# Patient Record
Sex: MALE | Race: WHITE | Employment: OTHER | URBAN - METROPOLITAN AREA
[De-identification: names, ages, dates, MRNs, and addresses within clinical notes are randomized per-mention and may not be internally consistent; named-entity substitution may affect disease eponyms.]

---

## 2019-01-29 ENCOUNTER — HOSPITAL ENCOUNTER (OUTPATIENT)
Dept: PHYSICAL THERAPY | Age: 74
Discharge: HOME | End: 2019-01-29
Payer: MEDICARE

## 2019-01-29 DIAGNOSIS — M54.5 LOW BACK PAIN, UNSPECIFIED BACK PAIN LATERALITY, UNSPECIFIED CHRONICITY, WITH SCIATICA PRESENCE UNSPECIFIED: ICD-10-CM

## 2019-01-29 DIAGNOSIS — M62.838 MUSCLE SPASM: ICD-10-CM

## 2019-01-29 PROCEDURE — 97161 PT EVAL LOW COMPLEX 20 MIN: CPT

## 2019-01-29 PROCEDURE — 97110 THERAPEUTIC EXERCISES: CPT

## 2019-01-29 NOTE — THERAPY EVALUATION
Charlie Mustafa  : 1945  Primary: [place] [employer] *  Secondary:  [facility] at [address]  [phone]   [phone]         OUTPATIENT PHYSICAL THERAPY:Initial Assessment 2019    ICD-10: Treatment Diagnosis: Low back pain (M54.5)  Difficulty in walking, not elsewhere classified (R26.2)  Precautions/Allergies:   Patient has no known allergies. MD Orders: evaluate and treat MEDICAL/REFERRING DIAGNOSIS:  Muscle spasm [M62.838]  Low back pain, unspecified back pain laterality, unspecified chronicity, with sciatica presence unspecified [M54.5]   DATE OF ONSET: 1 year ago  REFERRING PHYSICIAN: Patric Reveles MD  RETURN PHYSICIAN APPOINTMENT:      ASSESSMENT (Date: 19):  Mr. Lizeth Cantu presents to physical therapy with L sided lumbar pain occasionally during functional activities. He demonstrates decrease L hip ROM, L sided core and hip weakness, decreased functional mobility, and increased pain with functional activities. He would benefit from skilled PT to address the problem list and goals below. PROBLEM LIST (Impacting functional limitations):  1. Decreased Strength  2. Decreased ADL/Functional Activities  3. Decreased Ambulation Ability/Technique  4. Decreased Balance  5. Increased Pain  6. Decreased Activity Tolerance  7. Decreased Flexibility/Joint Mobility  8. Decreased [place] with Home Exercise Program INTERVENTIONS PLANNED:  1. Balance Exercise  2. Bed Mobility  3. Cold  4. Family Education  5. Heat  6. Home Exercise Program (HEP)  7. Manual Therapy  8. Therapeutic Activites  9. Therapeutic Exercise/Strengthening   TREATMENT PLAN:  Effective Dates: 2019 TO 3/30/2019 (60 days). Frequency/Duration: 2 times a week for 60 Days  GOALS: (Goals have been discussed and agreed upon with patient.)  Discharge Goals: Time Frame: 60 days  1. Patient will be independent with HEP.    2. Patient will have an decrease on the Oswestry to 0/50 in order to return to his PLOF. 3. Patient will demonstrate good stability during single leg bridging indicating improved core muscle activation. 4. Patient will verbalize being able to complete ADLs and IADLs with pain no greater than 1/10 in order to return to activities without compensations. Rehabilitation Potential For Stated Goals: Good  Regarding Nupur Olivares's therapy, I certify that the treatment plan above will be carried out by a therapist or under their direction. Thank you for this referral,  MELLO RoseT, NCS     Referring Physician Signature: Evelyn Edmonds MD              Date                    HISTORY:   Patient Stated Goal:        Pain relief  History of Present Injury/Illness (Reason for Referral):  Patient reports pain in his low back for about a year. On the left side and in the center. He noticed it first when he was trying to bowl. He will feel twinges every now and then but it goes away if he stops doing that activity. Current pain 1/10.  24 hour pain 2-3/10. He got a massage a few weeks ago that made his pain 5/10 for several days. He has had back pain previously that has gone away on its own. Has occasional difficulty with bowling, gardening, climbing cell towers  Past Medical History/Comorbidities:   Mr. Frida Fierro  has a past medical history of Arthritis and Elevated PSA. Mr. Frida Fierro  has a past surgical history that includes hx colonoscopy (2010); hx heent (Bilateral, 2009); hx heent (Bilateral, 2013); and hx tonsillectomy. Per medical history questionnaire/therapist interview: Arthritis, Dislocation/Fracture and Joint Pain  Social History/Living Environment:     lives with wife in a 2 story home.   Bedroom downstairs   Prior Level of Function/Work/Activity:  independent  Dominant Side:         RIGHT  Current Medications:    Current Outpatient Medications:     sildenafil, antihypertensive, (REVATIO) 20 mg tablet, Take 1 Tab by mouth three (3) times daily. , Disp: 30 Tab, Rfl: 1     Date Last Reviewed:  1/29/2019       Ambulatory/Rehab Services H2 Model Falls Risk Assessment    Risk Factors:       (1)  Gender [Male] Ability to Rise from Chair:       (0)  Ability to rise in a single movement    Falls Prevention Plan:       No modifications necessary   Total: (5 or greater = High Risk): 1    ©2010 [facility] of [facility]. All Rights Reserved. [facility] [employer] Patent #[id number]. Federal Law prohibits the replication, distribution or use without written permission from [facility] of [address] [place] [address]        Number of Personal Factors/Comorbidities that affect the Plan of Care: 0: LOW COMPLEXITY   EXAMINATION:   Observation/Orthostatic Postural Assessment:          genuvarus B, symmetrical leg length    Mental Status:          WFL  Palpation:          Pain with palpation to lumbar paraspinals on the L side  Sensation:         Light tough: WFL; Proprioception: WFL  Skin Integrity:          intact  Vision:          NT  Special Tests:          Fall to the L with bridge and L leg march, stable on the R side. Shooting pain with palpation of the piriformis muscle on the L, decrease 90-90 straight leg raise L more than R. Coordination:       NT  Balance:          Able to single leg balance x 10 seconds B with more hips balance reactions on the R    Lower Extremity:   Strength PROM   Action R L R  L    Hip Flexion       Hip Extension  4     Hip Abduction  4+     Hip Adduction       Knee Flexion       Knee Extension       Dorsi Flexion       Plantar Flexion       Inversion       Eversion             Decrease core strength during functional testing    Upper Extremity:         NT  Functional Mobility:         Gait/Ambulation:  Ambulates with good gait speed, no evidence of imbalance        Transfers:  independent        Bed Mobility:  Independent with minimal to no pain during rolling side to side        Stairs:   Mod I with 1/10 pain        Wheelchair: NT              Body Structures Involved:  1. Nerves  2. Joints  3. Muscles  4. Ligaments Body Functions Affected:  1. Sensory/Pain  2. Neuromusculoskeletal  3. Movement Related Activities and Participation Affected:  1. Domestic Life  2. Interpersonal Interactions and Relationships  3. Community, Social and [place]   Number of elements that affect the Plan of Care: 4+: HIGH COMPLEXITY   CLINICAL PRESENTATION:   Presentation: Stable and uncomplicated: LOW COMPLEXITY   CLINICAL DECISION MAKING:   Outcome Measure: Tool Used: Modified Oswestry Low Back Pain Questionnaire  Score:  Initial: 5/50  Most Recent: X/50 (Date: -- )   Interpretation of Score: Each section is scored on a 0-5 scale, 5 representing the greatest disability. The scores of each section are added together for a total score of 50. Medical Necessity:   · Patient is expected to demonstrate progress in strength, range of motion, balance and functional technique to decrease pain with functional mobility. · Patient demonstrates good rehab potential due to higher previous functional level. Reason for Services/Other Comments:  · Patient continues to require modification of therapeutic interventions to increase complexity of exercises. Use of outcome tool(s) and clinical judgement create a POC that gives a: Questionable prediction of patient's progress: MODERATE COMPLEXITY   TREATMENT:   (In addition to Assessment/Re-Assessment sessions the following treatments were rendered)  Pre Treatment Symptoms: see above    Evaluation: [id number]    Therapeutic Exercise: ( 4517-1650):  Exercises per grid below to improve mobility, strength and balance. Required moderate visual, verbal and tactile cues to promote proper body alignment and promote proper body mechanics. Progressed complexity of movement as indicated.    Date:  1/29/19 Date:   Date:     Activity/Exercise Parameters Parameters Parameters   TA activation in hooklying X 5 reps with 5 second hold     TA activation with march in hooklying X 5 reps each leg     HS stretch 2 x 30 sec L     Bridges with march X 5 reps- falling to the L with L leg march                             Treatment/Session Assessment:  See assessment above. · Pain/ Symptoms: Initial:   1/10 Post Session:  1/10 ·   Compliance with Program/Exercises: Will assess as treatment progresses. · Recommendations/Intent for next treatment session: \"Next visit will focus on advancements to more challenging activities and reduction in assistance provided\".   Total Treatment Duration:  PT Patient Time In/Time Out  Time In: 1300  Time Out: [place] 13, DPT

## 2019-01-31 ENCOUNTER — APPOINTMENT (OUTPATIENT)
Dept: PHYSICAL THERAPY | Age: 74
End: 2019-01-31
Payer: MEDICARE

## 2019-02-05 ENCOUNTER — HOSPITAL ENCOUNTER (OUTPATIENT)
Dept: PHYSICAL THERAPY | Age: 74
Discharge: HOME | End: 2019-02-05
Payer: MEDICARE

## 2019-02-05 PROCEDURE — 97110 THERAPEUTIC EXERCISES: CPT

## 2019-02-05 PROCEDURE — 97140 MANUAL THERAPY 1/> REGIONS: CPT

## 2019-02-05 NOTE — PROGRESS NOTES
Trung Ty  : 1945  Primary: [employer] *  Secondary:  [facility]rapy [facility] at [address]  [phone]   [phone]         OUTPATIENT PHYSICAL THERAPY:Daily Note 2019    ICD-10: Treatment Diagnosis: Low back pain (M54.5)  Difficulty in walking, not elsewhere classified (R26.2)  Precautions/Allergies:   Patient has no known allergies. MD Orders: evaluate and treat MEDICAL/REFERRING DIAGNOSIS:  Other muscle spasm [M62.838]  Low back pain [M54.5]   DATE OF ONSET: 1 year ago  REFERRING PHYSICIAN: Becky Bonds MD  RETURN PHYSICIAN APPOINTMENT:      ASSESSMENT (Date: 19):  Mr. Skylar Malone presents to physical therapy with L sided lumbar pain occasionally during functional activities. He demonstrates decrease L hip ROM, L sided core and hip weakness, decreased functional mobility, and increased pain with functional activities. He would benefit from skilled PT to address the problem list and goals below. PROBLEM LIST (Impacting functional limitations):  1. Decreased Strength  2. Decreased ADL/Functional Activities  3. Decreased Ambulation Ability/Technique  4. Decreased Balance  5. Increased Pain  6. Decreased Activity Tolerance  7. Decreased Flexibility/Joint Mobility  8. Decreased [place] with Home Exercise Program INTERVENTIONS PLANNED:  1. Balance Exercise  2. Bed Mobility  3. Cold  4. Family Education  5. Heat  6. Home Exercise Program (HEP)  7. Manual Therapy  8. Therapeutic Activites  9. Therapeutic Exercise/Strengthening   TREATMENT PLAN:  Effective Dates: 2019 TO 3/30/2019 (60 days). Frequency/Duration: 2 times a week for 60 Days  GOALS: (Goals have been discussed and agreed upon with patient.)  Discharge Goals: Time Frame: 60 days  1. Patient will be independent with HEP. 2. Patient will have an decrease on the Oswestry to 0/50 in order to return to his PLOF.     3. Patient will demonstrate good stability during single leg bridging indicating improved core muscle activation. 4. Patient will verbalize being able to complete ADLs and IADLs with pain no greater than 1/10 in order to return to activities without compensations. Rehabilitation Potential For Stated Goals: Good  Regarding Ankur George Olivares's therapy, I certify that the treatment plan above will be carried out by a therapist or under their direction. Thank you for this referral,  Sabina Garcia, DPT, NCS                 HISTORY:   Patient Stated Goal:        Pain relief  History of Present Injury/Illness (Reason for Referral):  Patient reports pain in his low back for about a year. On the left side and in the center. He noticed it first when he was trying to bowl. He will feel twinges every now and then but it goes away if he stops doing that activity. Current pain 1/10.  24 hour pain 2-3/10. He got a massage a few weeks ago that made his pain 5/10 for several days. He has had back pain previously that has gone away on its own. Has occasional difficulty with bowling, gardening, climbing cell towers  Past Medical History/Comorbidities:   Mr. Hollie White  has a past medical history of Arthritis and Elevated PSA. Mr. Hollie White  has a past surgical history that includes hx colonoscopy (2010); hx heent (Bilateral, 2009); hx heent (Bilateral, 2013); and hx tonsillectomy. Per medical history questionnaire/therapist interview: Arthritis, Dislocation/Fracture and Joint Pain  Social History/Living Environment:     lives with wife in a 2 story home. Bedroom downstairs   Prior Level of Function/Work/Activity:  independent  Dominant Side:         RIGHT  Current Medications:    Current Outpatient Medications:     sildenafil, antihypertensive, (REVATIO) 20 mg tablet, Take 1 Tab by mouth three (3) times daily. , Disp: 30 Tab, Rfl: 1     Date Last Reviewed:  2/5/2019       Ambulatory/Rehab Services H2 Model Falls Risk Assessment Risk Factors:       (1)  Gender [Male] Ability to Rise from Chair:       (0)  Ability to rise in a single movement    Falls Prevention Plan:       No modifications necessary   Total: (5 or greater = High Risk): 1    ©2010 [facility] of [employer] [place] Patent #[id number]. Federal Law prohibits the replication, distribution or use without written permission from [facility] of [employer]        Number of Personal Factors/Comorbidities that affect the Plan of Care: 0: LOW COMPLEXITY   EXAMINATION:   Observation/Orthostatic Postural Assessment:          genuvarus B, symmetrical leg length    Mental Status:          WFL  Palpation:          Pain with palpation to lumbar paraspinals on the L side  Sensation:         Light tough: WFL; Proprioception: WFL  Skin Integrity:          intact  Vision:          NT  Special Tests:          Fall to the L with bridge and L leg march, stable on the R side. Shooting pain with palpation of the piriformis muscle on the L, decrease 90-90 straight leg raise L more than R. Coordination:       NT  Balance:          Able to single leg balance x 10 seconds B with more hips balance reactions on the R    Lower Extremity:   Strength PROM   Action R L R  L    Hip Flexion       Hip Extension  4     Hip Abduction  4+     Hip Adduction       Knee Flexion       Knee Extension       Dorsi Flexion       Plantar Flexion       Inversion       Eversion             Decrease core strength during functional testing    Upper Extremity:         NT  Functional Mobility:         Gait/Ambulation:  Ambulates with good gait speed, no evidence of imbalance        Transfers:  independent        Bed Mobility:  Independent with minimal to no pain during rolling side to side        Stairs: Mod I with 1/10 pain        Wheelchair:  NT              Body Structures Involved:  1. Nerves  2. Joints  3. Muscles  4.  Ligaments Body Functions Affected:  1. Sensory/Pain  2. Neuromusculoskeletal  3. Movement Related Activities and Participation Affected:  1. Domestic Life  2. Interpersonal Interactions and Relationships  3. Community, Social and Independence [place]   Number of elements that affect the Plan of Care: 4+: HIGH COMPLEXITY   CLINICAL PRESENTATION:   Presentation: Stable and uncomplicated: LOW COMPLEXITY   CLINICAL DECISION MAKING:   Outcome Measure: Tool Used: Modified Oswestry Low Back Pain Questionnaire  Score:  Initial: 5/50  Most Recent: X/50 (Date: -- )   Interpretation of Score: Each section is scored on a 0-5 scale, 5 representing the greatest disability. The scores of each section are added together for a total score of 50. Medical Necessity:   · Patient is expected to demonstrate progress in strength, range of motion, balance and functional technique to decrease pain with functional mobility. · Patient demonstrates good rehab potential due to higher previous functional level. Reason for Services/Other Comments:  · Patient continues to require modification of therapeutic interventions to increase complexity of exercises. Use of outcome tool(s) and clinical judgement create a POC that gives a: Questionable prediction of patient's progress: MODERATE COMPLEXITY   TREATMENT:   (In addition to Assessment/Re-Assessment sessions the following treatments were rendered)  Pre Treatment Symptoms: patient reports no current pain. Says he had some pain with the bridging exercises. Therapeutic Exercise: (  40 minutes):  Exercises per grid below to improve mobility, strength and balance. Required moderate visual, verbal and tactile cues to promote proper body alignment and promote proper body mechanics. Progressed complexity of movement as indicated.    Date:  1/29/19 Date:   Date:      Activity/Exercise Parameters Parameters Parameters    TA activation in hooklying X 5 reps with 5 second hold X 10 reps with 5 sec hold     TA activation with march in hooklying X 5 reps each leg X 10 reps each     HS stretch 2 x 30 sec L 2 x 30 sec B      Bridges with march X 5 reps- falling to the L with L leg march 2 x 5 reps B      bike  X 5 minutes     Bird dogs  2 x 5 reps      Shotgun   3 x 5 sec hold      Prone hip extension   2 x 10 reps B     Dead bug   X 5 reps B     High knee marching  2 x 30' with 2 sec hold     Standing hip extension  X 10 reps B     SKTC  2 x 30 sec B                   MANUAL THERAPY: (13 minutes): Joint mobilization and Soft tissue mobilization was utilized and necessary because of the patient's restricted joint motion and restricted motion of soft tissue. (Used abbreviations: MET - muscle energy technique; PNF - proprioceptive neuromuscular facilitation; NMR - neuromuscular re-education; a/p - anterior to posterior; p/a - posterior to anterior, STM- soft tissue mobilization, MLD- modified lymphatic drainage)     In prone: STM to L glut region, piriformis  PA joint glides lumbar spine        Treatment/Session Assessment:  Patient reported no pain post session. Mild pain with manual therapy that eased with rest.  He was given an updated HEP. He continues to benefit from skilled PT to improve functional mobility and reduce pain during IADLs. · Pain/ Symptoms: Initial:   0/10 Post Session:  0/10 ·   Compliance with Program/Exercises: Will assess as treatment progresses. · Recommendations/Intent for next treatment session: \"Next visit will focus on advancements to more challenging activities and reduction in assistance provided\".   Total Treatment Duration:  PT Patient Time In/Time Out  Time In: 1592  Time Out: [address]7, DPT

## 2019-02-07 ENCOUNTER — HOSPITAL ENCOUNTER (OUTPATIENT)
Dept: PHYSICAL THERAPY | Age: 74
Discharge: HOME | End: 2019-02-07
Payer: MEDICARE

## 2019-02-07 PROCEDURE — 97140 MANUAL THERAPY 1/> REGIONS: CPT

## 2019-02-07 PROCEDURE — 97110 THERAPEUTIC EXERCISES: CPT

## 2019-02-07 NOTE — PROGRESS NOTES
Rachid Sage  : 1945  Primary: Iliana Louis Medicare Choice *  Secondary:  Therapy Center at [address]  [phone]   [phone]         OUTPATIENT PHYSICAL THERAPY:Daily Note 2019    ICD-10: Treatment Diagnosis: Low back pain (M54.5)  Difficulty in walking, not elsewhere classified (R26.2)  Precautions/Allergies:   Patient has no known allergies. MD Orders: evaluate and treat MEDICAL/REFERRING DIAGNOSIS:  Other muscle spasm [M62.838]  Low back pain [M54.5]   DATE OF ONSET: 1 year ago  REFERRING PHYSICIAN: Belkis Thomas MD  RETURN PHYSICIAN APPOINTMENT:      ASSESSMENT (Date: 19):  Mr. Caitlin Terrell presents to physical therapy with L sided lumbar pain occasionally during functional activities. He demonstrates decrease L hip ROM, L sided core and hip weakness, decreased functional mobility, and increased pain with functional activities. He would benefit from skilled PT to address the problem list and goals below. PROBLEM LIST (Impacting functional limitations):  1. Decreased Strength  2. Decreased ADL/Functional Activities  3. Decreased Ambulation Ability/Technique  4. Decreased Balance  5. Increased Pain  6. Decreased Activity Tolerance  7. Decreased Flexibility/Joint Mobility  8. Decreased [place] with Home Exercise Program INTERVENTIONS PLANNED:  1. Balance Exercise  2. Bed Mobility  3. Cold  4. Family Education  5. Heat  6. Home Exercise Program (HEP)  7. Manual Therapy  8. Therapeutic Activites  9. Therapeutic Exercise/Strengthening   TREATMENT PLAN:  Effective Dates: 2019 TO 3/30/2019 (60 days). Frequency/Duration: 2 times a week for 60 Days  GOALS: (Goals have been discussed and agreed upon with patient.)  Discharge Goals: Time Frame: 60 days  1. Patient will be independent with HEP. 2. Patient will have an decrease on the Oswestry to 0/50 in order to return to his PLOF.     3. Patient will demonstrate good stability during single leg bridging indicating improved core muscle activation. 4. Patient will verbalize being able to complete ADLs and IADLs with pain no greater than 1/10 in order to return to activities without compensations. Rehabilitation Potential For Stated Goals: Good  Regarding Lois Olivares's therapy, I certify that the treatment plan above will be carried out by a therapist or under their direction. Thank you for this referral,  Kimo Allen, DPT, NCS                 HISTORY:   Patient Stated Goal:        Pain relief  History of Present Injury/Illness (Reason for Referral):  Patient reports pain in his low back for about a year. On the left side and in the center. He noticed it first when he was trying to bowl. He will feel twinges every now and then but it goes away if he stops doing that activity. Current pain 1/10.  24 hour pain 2-3/10. He got a massage a few weeks ago that made his pain 5/10 for several days. He has had back pain previously that has gone away on its own. Has occasional difficulty with bowling, gardening, climbing cell towers  Past Medical History/Comorbidities:   Mr. Monica Martins  has a past medical history of Arthritis and Elevated PSA. Mr. Monica Martins  has a past surgical history that includes hx colonoscopy (2010); hx heent (Bilateral, 2009); hx heent (Bilateral, 2013); and hx tonsillectomy. Per medical history questionnaire/therapist interview: Arthritis, Dislocation/Fracture and Joint Pain  Social History/Living Environment:     lives with wife in a 2 story home. Bedroom downstairs   Prior Level of Function/Work/Activity:  independent  Dominant Side:         RIGHT  Current Medications:    Current Outpatient Medications:     sildenafil, antihypertensive, (REVATIO) 20 mg tablet, Take 1 Tab by mouth three (3) times daily. , Disp: 30 Tab, Rfl: 1     Date Last Reviewed:  2/7/2019       Ambulatory/Rehab Services H2 Model Falls Risk Assessment Risk Factors:       (1)  Gender [Male] Ability to Rise from Chair:       (0)  Ability to rise in a single movement    Falls Prevention Plan:       No modifications necessary   Total: (5 or greater = High Risk): 1    ©2010 [facility] Patent #[id number]. Federal Law prohibits the replication, distribution or use without written permission from [facility] of [employer]        Number of Personal Factors/Comorbidities that affect the Plan of Care: 0: LOW COMPLEXITY   EXAMINATION:   Observation/Orthostatic Postural Assessment:          genuvarus B, symmetrical leg length    Mental Status:          WFL  Palpation:          Pain with palpation to lumbar paraspinals on the L side  Sensation:         Light tough: WFL; Proprioception: WFL  Skin Integrity:          intact  Vision:          NT  Special Tests:          Fall to the L with bridge and L leg march, stable on the R side. Shooting pain with palpation of the piriformis muscle on the L, decrease 90-90 straight leg raise L more than R. Coordination:       NT  Balance:          Able to single leg balance x 10 seconds B with more hips balance reactions on the R    Lower Extremity:   Strength PROM   Action R L R  L    Hip Flexion       Hip Extension  4     Hip Abduction  4+     Hip Adduction       Knee Flexion       Knee Extension       Dorsi Flexion       Plantar Flexion       Inversion       Eversion             Decrease core strength during functional testing    Upper Extremity:         NT  Functional Mobility:         Gait/Ambulation:  Ambulates with good gait speed, no evidence of imbalance        Transfers:  independent        Bed Mobility:  Independent with minimal to no pain during rolling side to side        Stairs: Mod I with 1/10 pain        Wheelchair:  NT              Body Structures Involved:  1. Nerves  2. Joints  3. Muscles  4.  Ligaments Body Functions Affected:  1. Sensory/Pain  2. Neuromusculoskeletal  3. Movement Related Activities and Participation Affected:  1. Domestic Life  2. Interpersonal Interactions and Relationships  3. Community, Social and [place]   Number of elements that affect the Plan of Care: 4+: HIGH COMPLEXITY   CLINICAL PRESENTATION:   Presentation: Stable and uncomplicated: LOW COMPLEXITY   CLINICAL DECISION MAKING:   Outcome Measure: Tool Used: Modified Oswestry Low Back Pain Questionnaire  Score:  Initial: 5/50  Most Recent: X/50 (Date: -- )   Interpretation of Score: Each section is scored on a 0-5 scale, 5 representing the greatest disability. The scores of each section are added together for a total score of 50. Medical Necessity:   · Patient is expected to demonstrate progress in strength, range of motion, balance and functional technique to decrease pain with functional mobility. · Patient demonstrates good rehab potential due to higher previous functional level. Reason for Services/Other Comments:  · Patient continues to require modification of therapeutic interventions to increase complexity of exercises. Use of outcome tool(s) and clinical judgement create a POC that gives a: Questionable prediction of patient's progress: MODERATE COMPLEXITY   TREATMENT:   (In addition to Assessment/Re-Assessment sessions the following treatments were rendered)  Pre Treatment Symptoms: patient reports no pain with exercises. Has been doing his HEP. Therapeutic Exercise: (  40 minutes):  Exercises per grid below to improve mobility, strength and balance. Required moderate visual, verbal and tactile cues to promote proper body alignment and promote proper body mechanics. Progressed complexity of movement as indicated.    Date:  1/29/19 Date:  2/5/19 Date:  2/7/19    Activity/Exercise Parameters Parameters Parameters    TA activation in hooklying X 5 reps with 5 second hold X 10 reps with 5 sec hold X 10 reps with 5 sec hold TA activation with march in hooklying X 5 reps each leg X 10 reps each X 10 reps     HS stretch 2 x 30 sec L 2 x 30 sec B  2 x 30 sec B    Bridges with march X 5 reps- falling to the L with L leg march 2 x 5 reps B  X 10 reps B     bike  X 5 minutes X 5 minutes level 1    Bird dogs  2 x 5 reps  X 5 reps     Shotgun   3 x 5 sec hold      Prone hip extension   2 x 10 reps B 2 x 10 reps B    Dead bug   X 5 reps B 2 x 5 reps B    High knee marching  2 x 30' with 2 sec hold 2 x 30' with 2 sec hold     Standing hip extension  X 10 reps B 2 x 10 reps B     SKTC  2 x 30 sec B 2 x 30 sec B    LTR   X 5 reps B with 10 sec hold           MANUAL THERAPY: (13 minutes): Joint mobilization and Soft tissue mobilization was utilized and necessary because of the patient's restricted joint motion and restricted motion of soft tissue. (Used abbreviations: MET - muscle energy technique; PNF - proprioceptive neuromuscular facilitation; NMR - neuromuscular re-education; a/p - anterior to posterior; p/a - posterior to anterior, STM- soft tissue mobilization, MLD- modified lymphatic drainage)     In prone: STM to L glut region, piriformis  PA joint glides lumbar spine        Treatment/Session Assessment:  Patient had no pain with exercises today. Still demonstrating some core weakness and compensatory movements with exercises. He continues to benefit from skilled PT to decrease risk of reinjury. · Pain/ Symptoms: Initial:   0/10 Post Session:  0/10 ·   Compliance with Program/Exercises: Will assess as treatment progresses. · Recommendations/Intent for next treatment session: \"Next visit will focus on advancements to more challenging activities and reduction in assistance provided\".   Total Treatment Duration:  PT Patient Time In/Time Out  Time In: 8648  Time Out: [address], DPT

## 2019-02-12 ENCOUNTER — HOSPITAL ENCOUNTER (OUTPATIENT)
Dept: PHYSICAL THERAPY | Age: 74
Discharge: HOME | End: 2019-02-12
Payer: MEDICARE

## 2019-02-12 PROCEDURE — 97110 THERAPEUTIC EXERCISES: CPT

## 2019-02-12 PROCEDURE — 97140 MANUAL THERAPY 1/> REGIONS: CPT

## 2019-02-12 NOTE — PROGRESS NOTES
Oj Joyce  : 1945  Primary: Judi Louis Medicare Choice *  Secondary:  [facility]rapy Center at [address]  [phone]   [phone]         OUTPATIENT PHYSICAL THERAPY:Daily Note 2019    ICD-10: Treatment Diagnosis: Low back pain (M54.5)  Difficulty in walking, not elsewhere classified (R26.2)  Precautions/Allergies:   Patient has no known allergies. MD Orders: evaluate and treat MEDICAL/REFERRING DIAGNOSIS:  Other muscle spasm [M62.838]  Low back pain [M54.5]   DATE OF ONSET: 1 year ago  REFERRING PHYSICIAN: Luis Damon MD  RETURN PHYSICIAN APPOINTMENT:      ASSESSMENT (Date: 19):  Mr. Thomas Foley presents to physical therapy with L sided lumbar pain occasionally during functional activities. He demonstrates decrease L hip ROM, L sided core and hip weakness, decreased functional mobility, and increased pain with functional activities. He would benefit from skilled PT to address the problem list and goals below. PROBLEM LIST (Impacting functional limitations):  1. Decreased Strength  2. Decreased ADL/Functional Activities  3. Decreased Ambulation Ability/Technique  4. Decreased Balance  5. Increased Pain  6. Decreased Activity Tolerance  7. Decreased Flexibility/Joint Mobility  8. Decreased [place] with Home Exercise Program INTERVENTIONS PLANNED:  1. Balance Exercise  2. Bed Mobility  3. Cold  4. Family Education  5. Heat  6. Home Exercise Program (HEP)  7. Manual Therapy  8. Therapeutic Activites  9. Therapeutic Exercise/Strengthening   TREATMENT PLAN:  Effective Dates: 2019 TO 3/30/2019 (60 days). Frequency/Duration: 2 times a week for 60 Days  GOALS: (Goals have been discussed and agreed upon with patient.)  Discharge Goals: Time Frame: 60 days  1. Patient will be independent with HEP. 2. Patient will have an decrease on the Oswestry to 0/50 in order to return to his PLOF.     3. Patient will demonstrate good stability during single leg bridging indicating improved core muscle activation. 4. Patient will verbalize being able to complete ADLs and IADLs with pain no greater than 1/10 in order to return to activities without compensations. Rehabilitation Potential For Stated Goals: Good  Regarding Dexter Olivares's therapy, I certify that the treatment plan above will be carried out by a therapist or under their direction. Thank you for this referral,  Jacquelyn Byrne, DPT, NCS                 HISTORY:   Patient Stated Goal:        Pain relief  History of Present Injury/Illness (Reason for Referral):  Patient reports pain in his low back for about a year. On the left side and in the center. He noticed it first when he was trying to bowl. He will feel twinges every now and then but it goes away if he stops doing that activity. Current pain 1/10.  24 hour pain 2-3/10. He got a massage a few weeks ago that made his pain 5/10 for several days. He has had back pain previously that has gone away on its own. Has occasional difficulty with bowling, gardening, climbing cell towers  Past Medical History/Comorbidities:   Mr. Victor Hugo Rick  has a past medical history of Arthritis and Elevated PSA. Mr. Victor Hugo Rick  has a past surgical history that includes hx colonoscopy (2010); hx heent (Bilateral, 2009); hx heent (Bilateral, 2013); and hx tonsillectomy. Per medical history questionnaire/therapist interview: Arthritis, Dislocation/Fracture and Joint Pain  Social History/Living Environment:     lives with wife in a 2 story home. Bedroom downstairs   Prior Level of Function/Work/Activity:  independent  Dominant Side:         RIGHT  Current Medications:    Current Outpatient Medications:     sildenafil, antihypertensive, (REVATIO) 20 mg tablet, Take 1 Tab by mouth three (3) times daily. , Disp: 30 Tab, Rfl: 1     Date Last Reviewed:  2/12/2019       Ambulatory/Rehab Services H2 Model Falls Risk Assessment Risk Factors:       (1)  Gender [Male] Ability to Rise from Chair:       (0)  Ability to rise in a single movement    Falls Prevention Plan:       No modifications necessary   Total: (5 or greater = High Risk): 1    ©2010 [facility] of [place] [place] Patent #[id number]. Federal Law prohibits the replication, distribution or use without written permission from [facility] of [email or web address]        Number of Personal Factors/Comorbidities that affect the Plan of Care: 0: LOW COMPLEXITY   EXAMINATION:   Observation/Orthostatic Postural Assessment:          genuvarus B, symmetrical leg length    Mental Status:          WFL  Palpation:          Pain with palpation to lumbar paraspinals on the L side  Sensation:         Light tough: WFL; Proprioception: WFL  Skin Integrity:          intact  Vision:          NT  Special Tests:          Fall to the L with bridge and L leg march, stable on the R side. Shooting pain with palpation of the piriformis muscle on the L, decrease 90-90 straight leg raise L more than R. Coordination:       NT  Balance:          Able to single leg balance x 10 seconds B with more hips balance reactions on the R    Lower Extremity:   Strength PROM   Action R L R  L    Hip Flexion       Hip Extension  4     Hip Abduction  4+     Hip Adduction       Knee Flexion       Knee Extension       Dorsi Flexion       Plantar Flexion       Inversion       Eversion             Decrease core strength during functional testing    Upper Extremity:         NT  Functional Mobility:         Gait/Ambulation:  Ambulates with good gait speed, no evidence of imbalance        Transfers:  independent        Bed Mobility:  Independent with minimal to no pain during rolling side to side        Stairs: Mod I with 1/10 pain        Wheelchair:  NT              Body Structures Involved:  1. Nerves  2. Joints  3. Muscles  4.  Ligaments Body Functions Affected:  1. Sensory/Pain  2. Neuromusculoskeletal  3. Movement Related Activities and Participation Affected:  1. Domestic Life  2. Interpersonal Interactions and Relationships  3. Community, Social and [place]   Number of elements that affect the Plan of Care: 4+: HIGH COMPLEXITY   CLINICAL PRESENTATION:   Presentation: Stable and uncomplicated: LOW COMPLEXITY   CLINICAL DECISION MAKING:   Outcome Measure: Tool Used: Modified Oswestry Low Back Pain Questionnaire  Score:  Initial: 5/50  Most Recent: X/50 (Date: -- )   Interpretation of Score: Each section is scored on a 0-5 scale, 5 representing the greatest disability. The scores of each section are added together for a total score of 50. Medical Necessity:   · Patient is expected to demonstrate progress in strength, range of motion, balance and functional technique to decrease pain with functional mobility. · Patient demonstrates good rehab potential due to higher previous functional level. Reason for Services/Other Comments:  · Patient continues to require modification of therapeutic interventions to increase complexity of exercises. Use of outcome tool(s) and clinical judgement create a POC that gives a: Questionable prediction of patient's progress: MODERATE COMPLEXITY   TREATMENT:   (In addition to Assessment/Re-Assessment sessions the following treatments were rendered)  Pre Treatment Symptoms: patient reports no pain with exercises. Has been doing his HEP. Therapeutic Exercise: (  30 minutes):  Exercises per grid below to improve mobility, strength and balance. Required moderate visual, verbal and tactile cues to promote proper body alignment and promote proper body mechanics. Progressed complexity of movement as indicated.    Date:  1/29/19 Date:  2/5/19 Date:  2/7/19 Date:  2/12/19   Activity/Exercise Parameters Parameters Parameters Parameters   TA activation in hooklying X 5 reps with 5 second hold X 10 reps with 5 sec hold X 10 reps with 5 sec hold    TA activation with march in hooklying X 5 reps each leg X 10 reps each X 10 reps  X 10 reps   HS stretch 2 x 30 sec L 2 x 30 sec B  2 x 30 sec B 2 x 30 sec B   Bridges with march X 5 reps- falling to the L with L leg march 2 x 5 reps B  X 10 reps B     bike  X 5 minutes X 5 minutes level 1 X 5 minutes level 2    Bird dogs  2 x 5 reps  X 5 reps  X 5 reps   Shotgun   3 x 5 sec hold      Prone hip extension   2 x 10 reps B 2 x 10 reps B    Dead bug   X 5 reps B 2 x 5 reps B 2 x 5 reps B   High knee marching  2 x 30' with 2 sec hold 2 x 30' with 2 sec hold     Standing hip extension  X 10 reps B 2 x 10 reps B     SKTC  2 x 30 sec B 2 x 30 sec B X 30 sec B   LTR   X 5 reps B with 10 sec hold X 5 reps B with 5 sec hold   Hip flexion in supine with leg off EOB for movement through full ROM    X 10 reps B   thoracic spine mobilization    3 x 5 sec hold   MANUAL THERAPY: (15 minutes): Joint mobilization and Soft tissue mobilization was utilized and necessary because of the patient's restricted joint motion and restricted motion of soft tissue. (Used abbreviations: MET - muscle energy technique; PNF - proprioceptive neuromuscular facilitation; NMR - neuromuscular re-education; a/p - anterior to posterior; p/a - posterior to anterior, STM- soft tissue mobilization, MLD- modified lymphatic drainage)     In prone: P/A glides to lower thoracic spine to improve mobility  STM to lower thoracic paraspinals on the L to decrease pain        Treatment/Session Assessment:  Patient had decreased thoracic pain with manual therapy and stretching today. He was able to complete all exercises without increasing pain. He continues to benefit from skilled PT to improve functional mobility. · Pain/ Symptoms: Initial:   4/10 Post Session:  2/10 ·   Compliance with Program/Exercises: Will assess as treatment progresses. · Recommendations/Intent for next treatment session:  \"Next visit will focus on advancements to more challenging activities and reduction in assistance provided\".   Total Treatment Duration:  PT Patient Time In/Time Out  Time In: 0800  Time Out: [address]T

## 2019-02-14 ENCOUNTER — HOSPITAL ENCOUNTER (OUTPATIENT)
Dept: PHYSICAL THERAPY | Age: 74
Discharge: HOME | End: 2019-02-14
Payer: MEDICARE

## 2019-02-14 PROCEDURE — 97110 THERAPEUTIC EXERCISES: CPT

## 2019-02-14 NOTE — PROGRESS NOTES
Jolly Chapman  : 1945  Primary: Carlos Louis Medicare Choice *  Secondary:  Therapy Center at [address]  [phone]   [facility] [place] [phone]         OUTPATIENT PHYSICAL THERAPY:Discharge 2019    ICD-10: Treatment Diagnosis: Low back pain (M54.5)  Difficulty in walking, not elsewhere classified (R26.2)  Precautions/Allergies:   Patient has no known allergies. MD Orders: evaluate and treat MEDICAL/REFERRING DIAGNOSIS:  Other muscle spasm [M62.838]  Low back pain [M54.5]   DATE OF ONSET: 1 year ago  REFERRING PHYSICIAN: Johny Adame MD  RETURN PHYSICIAN APPOINTMENT: 2020     ASSESSMENT (Date: 19):  Mr. Margot Banda has been seen in physical therapy for 5 visits since 19. He has met 3/4 of his goals and is making progress towards his last goal.  He reports no pain during ADLs and IADLs. He is independent with his HEP. He reports he is ready to work on his exercises at home. He will be discharged at this time with an HEP. Thank you for this referral.    PROBLEM LIST (Impacting functional limitations):  1. Decreased Strength  2. Decreased ADL/Functional Activities  3. Decreased Ambulation Ability/Technique  4. Decreased Balance  5. Increased Pain  6. Decreased Activity Tolerance  7. Decreased Flexibility/Joint Mobility  8. Decreased Placer with Home Exercise Program INTERVENTIONS PLANNED:  1. Balance Exercise  2. Bed Mobility  3. Cold  4. Family Education  5. Heat  6. Home Exercise Program (HEP)  7. Manual Therapy  8. Therapeutic Activites  9. Therapeutic Exercise/Strengthening   TREATMENT PLAN:  Effective Dates: 2019 TO 3/30/2019 (60 days). Frequency/Duration: 2 times a week for 60 Days  GOALS: (Goals have been discussed and agreed upon with patient.)  Discharge Goals: Time Frame: 60 days  1. Patient will be independent with HEP. MET  2.  Patient will have an decrease on the Oswestry to 0/50 in order to return to his PLOF.  NOT MET  3. Patient will demonstrate good stability during single leg bridging indicating improved core muscle activation. MET  4. Patient will verbalize being able to complete ADLs and IADLs with pain no greater than 1/10 in order to return to activities without compensations. MET  Rehabilitation Potential For Stated Goals: Good  Regarding Tal Olivares's therapy, I certify that the treatment plan above will be carried out by a therapist or under their direction. Thank you for this referral,  Ana Washington, DPT, NCS                 HISTORY:   Patient Stated Goal:        Pain relief  History of Present Injury/Illness (Reason for Referral):  Patient reports pain in his low back for about a year. On the left side and in the center. He noticed it first when he was trying to bowl. He will feel twinges every now and then but it goes away if he stops doing that activity. Current pain 1/10.  24 hour pain 2-3/10. He got a massage a few weeks ago that made his pain 5/10 for several days. He has had back pain previously that has gone away on its own. Has occasional difficulty with bowling, gardening, climbing cell towers  Past Medical History/Comorbidities:   Mr. Skylar Malone  has a past medical history of Arthritis and Elevated PSA. Mr. Skylar Malone  has a past surgical history that includes hx colonoscopy (2010); hx heent (Bilateral, 2009); hx heent (Bilateral, 2013); and hx tonsillectomy. Per medical history questionnaire/therapist interview: Arthritis, Dislocation/Fracture and Joint Pain  Social History/Living Environment:     lives with wife in a 2 story home. Bedroom downstairs   Prior Level of Function/Work/Activity:  independent  Dominant Side:         RIGHT  Current Medications:    Current Outpatient Medications:     sildenafil, antihypertensive, (REVATIO) 20 mg tablet, Take 1 Tab by mouth three (3) times daily. , Disp: 30 Tab, Rfl: 1     Date Last Reviewed:  2/14/2019       Ambulatory/Rehab Services H2 Model Falls Risk Assessment    Risk Factors:       (1)  Gender [Male] Ability to Rise from Chair:       (0)  Ability to rise in a single movement    Falls Prevention Plan:       No modifications necessary   Total: (5 or greater = High Risk): 1    ©2010 [facility] [employer]. All Rights Reserved. [facility] Patent #[id number]. Federal Law prohibits the replication, distribution or use without written permission from [facility] [employer]        Number of Personal Factors/Comorbidities that affect the Plan of Care: 0: LOW COMPLEXITY   EXAMINATION:   Observation/Orthostatic Postural Assessment:          genuvarus B, symmetrical leg length    Mental Status:          WFL  Palpation:          Pain with palpation to lumbar paraspinals on the L side  Sensation:         Light tough: WFL; Proprioception: WFL  Skin Integrity:          intact  Vision:          NT  Special Tests:          Stable during bridging. no painful palpation , decrease 90-90 straight leg raise L more than R. Coordination:       NT  Balance:          Able to single leg balance x 10 seconds B with more hips balance reactions on the R    Lower Extremity:   Strength PROM   Action R L R  L    Hip Flexion       Hip Extension  4+     Hip Abduction  4+     Hip Adduction       Knee Flexion       Knee Extension       Dorsi Flexion       Plantar Flexion       Inversion       Eversion             Decrease core strength during functional testing    Upper Extremity:         NT  Functional Mobility:         Gait/Ambulation:  Ambulates with good gait speed, no evidence of imbalance        Transfers:  independent        Bed Mobility:  Independent with minimal to no pain during rolling side to side        Stairs: Mod I with 1/10 pain        Wheelchair:  NT              Body Structures Involved:  1. Nerves  2. Joints  3. Muscles  4. Ligaments Body Functions Affected:  1. Sensory/Pain  2. Neuromusculoskeletal  3.  Movement Related Activities and Participation Affected:  1. Domestic Life  2. Interpersonal Interactions and Relationships  3. Community, Social and [place]   Number of elements that affect the Plan of Care: 4+: HIGH COMPLEXITY   CLINICAL PRESENTATION:   Presentation: Stable and uncomplicated: LOW COMPLEXITY   CLINICAL DECISION MAKING:   Outcome Measure: Tool Used: Modified Oswestry Low Back Pain Questionnaire  Score:  Initial: 5/50  Most Recent: 3/50 (Date: 2/14/19)   Interpretation of Score: Each section is scored on a 0-5 scale, 5 representing the greatest disability. The scores of each section are added together for a total score of 50. Medical Necessity:   · Patient is expected to demonstrate progress in strength, range of motion, balance and functional technique to decrease pain with functional mobility. · Patient demonstrates good rehab potential due to higher previous functional level. Reason for Services/Other Comments:  · Patient continues to require modification of therapeutic interventions to increase complexity of exercises. Use of outcome tool(s) and clinical judgement create a POC that gives a: Questionable prediction of patient's progress: MODERATE COMPLEXITY   TREATMENT:   (In addition to Assessment/Re-Assessment sessions the following treatments were rendered)  Pre Treatment Symptoms: patient reports he was doing the thoracic mobilization at home and heard a pop and his back has been feeling great since. He feels ready to work on his exercises on his own. Therapeutic Exercise: (  39 minutes):  Exercises per grid below to improve mobility, strength and balance. Required moderate visual, verbal and tactile cues to promote proper body alignment and promote proper body mechanics. Progressed complexity of movement as indicated.    Date:  2/5/19 Date:  2/7/19 Date:  2/12/19 Date:  2/14/19   Activity/Exercise Parameters Parameters Parameters Parameters    TA activation in hooklying X 10 reps with 5 sec hold X 10 reps with 5 sec hold     TA activation with march in hooklying X 10 reps each X 10 reps  X 10 reps X 10 reps   HS stretch 2 x 30 sec B  2 x 30 sec B 2 x 30 sec B 2 x 30 sec B   Bridges with march 2 x 5 reps B  X 10 reps B      bike X 5 minutes X 5 minutes level 1 X 5 minutes level 2  X 5 minutes level 3   Bird dogs 2 x 5 reps  X 5 reps  X 5 reps 2 X 5 reps    Shotgun  3 x 5 sec hold       Prone hip extension  2 x 10 reps B 2 x 10 reps B     Dead bug  X 5 reps B 2 x 5 reps B 2 x 5 reps B 2 x 5 reps B    High knee marching 2 x 30' with 2 sec hold 2 x 30' with 2 sec hold   2 x 30'    Standing hip extension X 10 reps B 2 x 10 reps B   2 x 10 reps B   SKTC 2 x 30 sec B 2 x 30 sec B X 30 sec B X 30 sec B    LTR  X 5 reps B with 10 sec hold X 5 reps B with 5 sec hold X 5 reps B with 5 sec hold   Hip flexion in supine with leg off EOB for movement through full ROM   X 10 reps B X 10 reps B   thoracic spine mobilization in quadruped    3 x 5 sec hold 5 x 5 sec hold   MANUAL THERAPY: (0 minutes): Joint mobilization and Soft tissue mobilization was utilized and necessary because of the patient's restricted joint motion and restricted motion of soft tissue. (Used abbreviations: MET - muscle energy technique; PNF - proprioceptive neuromuscular facilitation; NMR - neuromuscular re-education; a/p - anterior to posterior; p/a - posterior to anterior, STM- soft tissue mobilization, MLD- modified lymphatic drainage)     In prone: P/A glides to lower thoracic spine to improve mobility  STM to lower thoracic paraspinals on the L to decrease pain        Treatment/Session Assessment:  See assessment above. · Pain/ Symptoms: Initial:   0/10 Post Session:  0/10 ·   Compliance with Program/Exercises: Will assess as treatment progresses. · Recommendations/Intent for next treatment session: \"Next visit will focus on advancements to more challenging activities and reduction in assistance provided\".   Total Treatment Duration:  PT Patient Time In/Time Out  Time In: 0758  Time Out: 1011 [facility], DPT

## 2020-06-08 ENCOUNTER — HOSPITAL ENCOUNTER (OUTPATIENT)
Dept: SURGERY | Age: 75
Discharge: HOME | End: 2020-06-08

## 2020-06-08 VITALS — WEIGHT: 175 LBS | HEIGHT: 71 IN | BODY MASS INDEX: 24.5 KG/M2

## 2020-06-08 NOTE — PERIOP NOTES
Patient verified name, , and procedure. Type: 1a; abbreviated assessment per anesthesia guidelines    Labs per anesthesia: none    Patient notified that a negative COVID swab is required before proceeding with their procedure. Instructed pt that they will be notified the day before their procedure by the GI Lab for time of arrival if their procedure is [facility] and Pre-op for [facility] [place] [address] [facility]. Arrival times should be called by 5 pm. If no phone is received the patient should contact their respective hospital. The GI lab telephone number is [phone] and ES Pre-op is [phone]. Follow diet and prep instructions per office including NPO status. If patient has NOT received instructions from office patient is advised to call surgeon office, verbalizes understanding. Bath or shower the night before and the am of surgery with non-mositurizing soap. No lotions, oils, powders, cologne on skin. No make up, eye make up or jewelry. Wear loose fitting comfortable, clean clothing. Must have adult present in building the entire time . Medications for the day of procedure None, patient to hold none    The following discharge instructions reviewed with patient: medication given during procedure may cause drowsiness for several hours, therefore, do not drive or operate machinery for remainder of the day. You may not drink alcohol on the day of your procedure, please resume regular diet and activity unless otherwise directed. You may experience abdominal distention for several hours that is relieved by the passage of gas. Contact your physician if you have any of the following: fever or chills, severe abdominal pain or excessive amount of bleeding or a large amount when having a bowel movement.  Occasional specks of blood with bowel movement would not be unusual.

## 2020-06-14 ENCOUNTER — ANESTHESIA EVENT (OUTPATIENT)
Dept: ENDOSCOPY | Age: 75
End: 2020-06-14
Payer: MEDICARE

## 2020-06-14 RX ORDER — SODIUM CHLORIDE 0.9 % (FLUSH) 0.9 %
5-40 SYRINGE (ML) INJECTION AS NEEDED
Status: CANCELLED | OUTPATIENT
Start: 2020-06-14

## 2020-06-14 RX ORDER — OXYCODONE AND ACETAMINOPHEN 10; 325 MG/1; MG/1
1 TABLET ORAL AS NEEDED
Status: CANCELLED | OUTPATIENT
Start: 2020-06-14

## 2020-06-14 RX ORDER — OXYCODONE HYDROCHLORIDE 5 MG/1
5 TABLET ORAL
Status: CANCELLED | OUTPATIENT
Start: 2020-06-14 | End: 2020-06-15

## 2020-06-14 RX ORDER — SODIUM CHLORIDE 0.9 % (FLUSH) 0.9 %
5-40 SYRINGE (ML) INJECTION EVERY 8 HOURS
Status: CANCELLED | OUTPATIENT
Start: 2020-06-14

## 2020-06-14 RX ORDER — HYDROMORPHONE HYDROCHLORIDE 2 MG/ML
0.5 INJECTION, SOLUTION INTRAMUSCULAR; INTRAVENOUS; SUBCUTANEOUS
Status: CANCELLED | OUTPATIENT
Start: 2020-06-14

## 2020-06-15 ENCOUNTER — HOSPITAL ENCOUNTER (OUTPATIENT)
Age: 75
Setting detail: OUTPATIENT SURGERY
Discharge: HOME | End: 2020-06-15
Attending: SURGERY | Admitting: SURGERY
Payer: MEDICARE

## 2020-06-15 ENCOUNTER — ANESTHESIA (OUTPATIENT)
Dept: ENDOSCOPY | Age: 75
End: 2020-06-15
Payer: MEDICARE

## 2020-06-15 VITALS
DIASTOLIC BLOOD PRESSURE: 75 MMHG | TEMPERATURE: 97.5 F | HEIGHT: 71 IN | BODY MASS INDEX: 24.08 KG/M2 | HEART RATE: 64 BPM | RESPIRATION RATE: 16 BRPM | SYSTOLIC BLOOD PRESSURE: 123 MMHG | OXYGEN SATURATION: 93 % | WEIGHT: 172 LBS

## 2020-06-15 DIAGNOSIS — K64.8 INTERNAL AND EXTERNAL HEMORRHOIDS WITHOUT COMPLICATION: ICD-10-CM

## 2020-06-15 DIAGNOSIS — K63.5 MULTIPLE POLYPS OF SIGMOID COLON: ICD-10-CM

## 2020-06-15 DIAGNOSIS — K64.4 INTERNAL AND EXTERNAL HEMORRHOIDS WITHOUT COMPLICATION: ICD-10-CM

## 2020-06-15 PROCEDURE — 76060000032 HC ANESTHESIA 0.5 TO 1 HR: Performed by: SURGERY

## 2020-06-15 PROCEDURE — 74011250636 HC RX REV CODE- 250/636: Performed by: ANESTHESIOLOGY

## 2020-06-15 PROCEDURE — 74011250636 HC RX REV CODE- 250/636: Performed by: REGISTERED NURSE

## 2020-06-15 PROCEDURE — 76040000026: Performed by: SURGERY

## 2020-06-15 PROCEDURE — 74011000250 HC RX REV CODE- 250: Performed by: REGISTERED NURSE

## 2020-06-15 PROCEDURE — 45384 COLONOSCOPY W/LESION REMOVAL: CPT | Performed by: SURGERY

## 2020-06-15 PROCEDURE — 77030009426 HC FCPS BIOP ENDOSC BSC -B: Performed by: SURGERY

## 2020-06-15 PROCEDURE — 88305 TISSUE EXAM BY PATHOLOGIST: CPT

## 2020-06-15 RX ORDER — PROPOFOL 10 MG/ML
INJECTION, EMULSION INTRAVENOUS
Status: DISCONTINUED | OUTPATIENT
Start: 2020-06-15 | End: 2020-06-15 | Stop reason: HOSPADM

## 2020-06-15 RX ORDER — LIDOCAINE HYDROCHLORIDE 20 MG/ML
INJECTION, SOLUTION EPIDURAL; INFILTRATION; INTRACAUDAL; PERINEURAL AS NEEDED
Status: DISCONTINUED | OUTPATIENT
Start: 2020-06-15 | End: 2020-06-15 | Stop reason: HOSPADM

## 2020-06-15 RX ORDER — SODIUM CHLORIDE, SODIUM LACTATE, POTASSIUM CHLORIDE, CALCIUM CHLORIDE 600; 310; 30; 20 MG/100ML; MG/100ML; MG/100ML; MG/100ML
75 INJECTION, SOLUTION INTRAVENOUS CONTINUOUS
Status: DISCONTINUED | OUTPATIENT
Start: 2020-06-15 | End: 2020-06-15 | Stop reason: HOSPADM

## 2020-06-15 RX ORDER — PROPOFOL 10 MG/ML
INJECTION, EMULSION INTRAVENOUS AS NEEDED
Status: DISCONTINUED | OUTPATIENT
Start: 2020-06-15 | End: 2020-06-15 | Stop reason: HOSPADM

## 2020-06-15 RX ADMIN — SODIUM CHLORIDE, SODIUM LACTATE, POTASSIUM CHLORIDE, AND CALCIUM CHLORIDE 75 ML/HR: 600; 310; 30; 20 INJECTION, SOLUTION INTRAVENOUS at 07:00

## 2020-06-15 RX ADMIN — PROPOFOL 70 MG: 10 INJECTION, EMULSION INTRAVENOUS at 08:14

## 2020-06-15 RX ADMIN — PROPOFOL 160 MCG/KG/MIN: 10 INJECTION, EMULSION INTRAVENOUS at 08:14

## 2020-06-15 RX ADMIN — LIDOCAINE HYDROCHLORIDE 100 MG: 20 INJECTION, SOLUTION EPIDURAL; INFILTRATION; INTRACAUDAL; PERINEURAL at 08:14

## 2020-06-15 NOTE — DISCHARGE INSTRUCTIONS
Gastrointestinal Colonoscopy/Flexible Sigmoidoscopy - Lower Exam Discharge Instructions  1. Call Dr. Elizabeth Croft for any problems or questions. 2. Contact the doctors office for follow up appointment as directed  3. Medication may cause drowsiness for several hours, therefore:  · Do not drive or operate machinery for reminder of the day. · No alcohol today. · Do not make any important or legal decisions for 24 hours. · Do not sign any legal documents for 24 hours. 4. Ordinarily, you may resume regular diet and activity after exam unless otherwise specified by your physician. 5. Because of air put into your colon during exam, you may experience some abdominal distension, relieved by the passage of gas, for several hours. 6. Contact your physician if you have any of the following:  · Excessive amount of bleeding - large amount when having a bowel movement. Occasional specks of blood with bowel movement would not be unusual.  · Severe abdominal pain  · Fever or Chills  7. Polyp Removal - follow these additional instructions  · Clear liquid diet for the next meal (jell-o, broth, clear drinks)  · Soft diet for 24 hours, then resume regular diet   · Take Metamucil - 1 tablespoon in juice every morning for 3 days  · No Aspirin, Advil, Aleve, Nuprin, Ibuprofen, or medications that contain these drugs for 2 weeks. Instructions given to Malick Bg and other family members. Instructions given by:   Sheryl Bhagat RN

## 2020-06-15 NOTE — H&P
Cleo Ariza MD   Bariatric & Advanced Laparoscopic Surgery & Endoscopy  [address] [place] [address]  Phone [phone]   Fax [phone]      Date of visit: 6/15/2020      Primary/Requesting provider: Lucia Locke MD         Name: Kole Ortiz      MRN: [id number]       : 1945       Age: 77 y.o. Sex: male        PCP: Lucia Locke MD     CC:    No chief complaint on file. HPI:     Kole Ortiz is a 77 y.o. male was referred here for a colonoscopy by PCP. Family hx of colon cancer NO      Previous colonoscopy YES   BRBPR NO   Melena NO   Loss of appetite NO   Weight loss NO      Change in bowel habits NO       PMH:    Past Medical History:   Diagnosis Date    Arthritis     Elevated PSA     PUD (peptic ulcer disease)     50 yrs ago       PSH:    Past Surgical History:   Procedure Laterality Date    HX COLONOSCOPY      HX HEENT Bilateral     blepharaplasty    HX HEENT Bilateral     blepharaplast    HX TONSILLECTOMY         MEDS:    Current Facility-Administered Medications   Medication    lactated Ringers infusion       ALLERGIES:      No Known Allergies    SH:    Social History     Tobacco Use    Smoking status: Never Smoker    Smokeless tobacco: Never Used   Substance Use Topics    Alcohol use: Yes     Alcohol/week: 0.8 - 1.7 standard drinks     Types: 1 - 2 Glasses of wine per week    Drug use: No       FH:    Family History   Problem Relation Age of Onset    Alcohol abuse Mother     Diabetes Mother     Heart Disease Mother     Alcohol abuse Father     Heart Disease Father     Heart Disease Brother        Review of systems:  Review of Systems   Constitutional: Negative for chills and weight loss. HENT: Negative for ear discharge, ear pain and tinnitus. Eyes: Negative for double vision, photophobia and pain. Respiratory: Negative for hemoptysis and sputum production.     Cardiovascular: Negative for palpitations, orthopnea and claudication. Gastrointestinal: Negative for abdominal pain, blood in stool, constipation, diarrhea, heartburn, melena, nausea and vomiting. Genitourinary: Negative for frequency and urgency. Musculoskeletal: Negative for back pain, falls and neck pain. Neurological: Negative for tingling, tremors and speech change. Endo/Heme/Allergies: Does not bruise/bleed easily. Psychiatric/Behavioral: Negative for substance abuse. The patient is not nervous/anxious. Physical Exam:     Visit Vitals  /90   Pulse 67   Temp 97.8 °F (36.6 °C)   Resp 18   Ht 5' 11\" (1.803 m)   Wt 172 lb (78 kg)   SpO2 99%   BMI 23.99 kg/m²       General:  Well-developed, well-nourished, no distress. Psych:  Cooperative, good insight and judgement. Neuro:  Alert, oriented to person, place and time. HEENT:  Normocephalic, atraumatic. Sclera clear. Lungs:  Unlabored breathing. Symmetrical chest expansion. Chest wall:  No tenderness or deformity. Heart:  Regular rate and rhythm. No JVD. Abdomen:  Soft, non-tender, non-distended. No palpable masses. Extremities:  Extremities normal, atraumatic, no cyanosis or edema. Skin:  Skin color, texture, turgor normal. No rashes. Labs: All recent labs were reviewed. Normal Hgb. Normal lytes. _______________  Assessment:  Wilma Johnson is a 77 y.o. male was referred here for a colonoscopy. Recommendations:     1. Schedule for colonoscopy. The risks, benefits, alternatives, and consequences were reviewed with the patient, who expressed verbal understanding and agreement to proceed.        Signed: Rachana Dubois MD  Bariatric & Minimally Invasive Surgery  [facility]  6/15/2020

## 2020-06-15 NOTE — OP NOTES
Colonoscopy Procedure Note    Date of procedure: 6/15/2020      Name: Mary Muñoz      MRN: [id number]       : 1945       Age: 77 y.o. Sex: male    Preoperative Diagnosis: 1. CRC screening          Postoperative Diagnosis: 1. same      2. Internal and External hemorrhoids      3. Sigmoid polyps    Procedure in Detail:  Informed consent was obtained for the procedure. The patient was placed in the left lateral decubitus position and sedation was induced by anesthesia. The [id number] was inserted into the rectum and advanced under direct vision to the cecum, which was identified by the ileocecal valve and appendiceal orifice. The quality of the colonic preparation was good. A careful inspection was made as the colonoscope was withdrawn, including a retroflexed view of the rectum; findings and interventions are described below. Appropriate photodocumentation was obtained. Findings:   Rectum:     - Protruding lesions:     -External Hemorrhoids and     -Internal Hemorrhoids  Sigmoid:     - Protruding lesions:     -Sessile Polyp(s) size 4-5 mm removed by polypectomy (hot biopsy) Total 2 polyps  Descending Colon:   Normal  Transverse Colon:   Normal  Ascending Colon:   Normal  Cecum:   Normal    Specimens: Specimens were collected and sent to pathology. Complications: None; patient tolerated the procedure well. EBL - minimal    Recommendations:   - Await pathology. - If adenoma is present, repeat colonoscopy in 5 years.      Signed By: Deanna Chamberlain MD  [facility] - Bariatric & Minimally Invasive Surgery  6/15/2020 8:48 AM

## 2020-06-15 NOTE — ANESTHESIA POSTPROCEDURE EVALUATION
Procedure(s):  COLONOSCOPY/ BMI 27  COLON BIOPSY. total IV anesthesia    Anesthesia Post Evaluation      Multimodal analgesia: multimodal analgesia used between 6 hours prior to anesthesia start to PACU discharge  Patient location during evaluation: PACU  Patient participation: complete - patient participated  Level of consciousness: awake  Pain management: adequate  Airway patency: patent  Anesthetic complications: no  Cardiovascular status: acceptable and hemodynamically stable  Respiratory status: acceptable  Hydration status: acceptable  Comments: Acceptable for discharge from PACU.   Post anesthesia nausea and vomiting:  none  Final Post Anesthesia Temperature Assessment:  Normothermia (36.0-37.5 degrees C)      INITIAL Post-op Vital signs:   Vitals Value Taken Time   BP 81/49 6/15/2020  9:08 AM   Temp 36.4 °C (97.5 °F) 6/15/2020  8:56 AM   Pulse 62 6/15/2020  9:08 AM   Resp 16 6/15/2020  9:08 AM   SpO2 95 % 6/15/2020  9:08 AM

## 2020-06-15 NOTE — ANESTHESIA PREPROCEDURE EVALUATION
Relevant Problems   No relevant active problems       Anesthetic History   No history of anesthetic complications            Review of Systems / Medical History  Patient summary reviewed and pertinent labs reviewed    Pulmonary  Within defined limits                 Neuro/Psych   Within defined limits           Cardiovascular                  Exercise tolerance: >4 METS     GI/Hepatic/Renal           PUD     Endo/Other        Arthritis     Other Findings              Physical Exam    Airway  Mallampati: III  TM Distance: > 6 cm  Neck ROM: decreased range of motion   Mouth opening: Diminished (comment)     Cardiovascular    Rhythm: regular           Dental  No notable dental hx       Pulmonary                 Abdominal  GI exam deferred       Other Findings            Anesthetic Plan    ASA: 2  Anesthesia type: total IV anesthesia          Induction: Intravenous  Anesthetic plan and risks discussed with: Patient

## 2020-06-17 NOTE — PROGRESS NOTES
Please call patient about pathology results. The polyps removed are benign but have potential to become malignant. Closer surveillance is needed to remove them before they become malignant. Colon recall in 5 years.      Madalyn Campoverde MD  Bariatric & Minimally Invasive Surgery  [facility]  6/17/2020 7:12 AM

## 2022-09-06 SDOH — HEALTH STABILITY: PHYSICAL HEALTH: ON AVERAGE, HOW MANY MINUTES DO YOU ENGAGE IN EXERCISE AT THIS LEVEL?: 60 MIN

## 2022-09-06 SDOH — HEALTH STABILITY: PHYSICAL HEALTH: ON AVERAGE, HOW MANY DAYS PER WEEK DO YOU ENGAGE IN MODERATE TO STRENUOUS EXERCISE (LIKE A BRISK WALK)?: 3 DAYS

## 2022-09-06 ASSESSMENT — PATIENT HEALTH QUESTIONNAIRE - PHQ9
SUM OF ALL RESPONSES TO PHQ9 QUESTIONS 1 & 2: 0
SUM OF ALL RESPONSES TO PHQ QUESTIONS 1-9: 0
2. FEELING DOWN, DEPRESSED OR HOPELESS: 0
SUM OF ALL RESPONSES TO PHQ QUESTIONS 1-9: 0
1. LITTLE INTEREST OR PLEASURE IN DOING THINGS: 0

## 2022-09-06 ASSESSMENT — LIFESTYLE VARIABLES
HOW MANY STANDARD DRINKS CONTAINING ALCOHOL DO YOU HAVE ON A TYPICAL DAY: 1 OR 2
HOW OFTEN DO YOU HAVE A DRINK CONTAINING ALCOHOL: 2-3 TIMES A WEEK

## 2022-10-27 SDOH — HEALTH STABILITY: PHYSICAL HEALTH: ON AVERAGE, HOW MANY DAYS PER WEEK DO YOU ENGAGE IN MODERATE TO STRENUOUS EXERCISE (LIKE A BRISK WALK)?: 3 DAYS

## 2022-10-27 SDOH — HEALTH STABILITY: PHYSICAL HEALTH: ON AVERAGE, HOW MANY MINUTES DO YOU ENGAGE IN EXERCISE AT THIS LEVEL?: 60 MIN

## 2022-10-27 ASSESSMENT — LIFESTYLE VARIABLES
HOW MANY STANDARD DRINKS CONTAINING ALCOHOL DO YOU HAVE ON A TYPICAL DAY: 1 OR 2
HOW MANY STANDARD DRINKS CONTAINING ALCOHOL DO YOU HAVE ON A TYPICAL DAY: 1
HOW OFTEN DO YOU HAVE A DRINK CONTAINING ALCOHOL: 4
HOW OFTEN DO YOU HAVE A DRINK CONTAINING ALCOHOL: 2-3 TIMES A WEEK
HOW OFTEN DO YOU HAVE SIX OR MORE DRINKS ON ONE OCCASION: 1

## 2022-10-27 ASSESSMENT — PATIENT HEALTH QUESTIONNAIRE - PHQ9
SUM OF ALL RESPONSES TO PHQ QUESTIONS 1-9: 0
SUM OF ALL RESPONSES TO PHQ9 QUESTIONS 1 & 2: 0
SUM OF ALL RESPONSES TO PHQ QUESTIONS 1-9: 0
SUM OF ALL RESPONSES TO PHQ QUESTIONS 1-9: 0
2. FEELING DOWN, DEPRESSED OR HOPELESS: 0
1. LITTLE INTEREST OR PLEASURE IN DOING THINGS: 0
SUM OF ALL RESPONSES TO PHQ QUESTIONS 1-9: 0

## 2022-11-03 ENCOUNTER — OFFICE VISIT (OUTPATIENT)
Dept: FAMILY MEDICINE CLINIC | Facility: CLINIC | Age: 77
End: 2022-11-03
Payer: MEDICARE

## 2022-11-03 VITALS
BODY MASS INDEX: 24.64 KG/M2 | SYSTOLIC BLOOD PRESSURE: 162 MMHG | TEMPERATURE: 97.4 F | HEIGHT: 71 IN | DIASTOLIC BLOOD PRESSURE: 82 MMHG | OXYGEN SATURATION: 97 % | HEART RATE: 73 BPM | WEIGHT: 176 LBS

## 2022-11-03 DIAGNOSIS — Z00.00 MEDICARE ANNUAL WELLNESS VISIT, SUBSEQUENT: Primary | ICD-10-CM

## 2022-11-03 DIAGNOSIS — Z12.5 SCREENING FOR MALIGNANT NEOPLASM OF PROSTATE: ICD-10-CM

## 2022-11-03 DIAGNOSIS — E78.00 PURE HYPERCHOLESTEROLEMIA, UNSPECIFIED: ICD-10-CM

## 2022-11-03 DIAGNOSIS — R97.20 ELEVATED PROSTATE SPECIFIC ANTIGEN (PSA): ICD-10-CM

## 2022-11-03 LAB
APPEARANCE UR: CLEAR
BACTERIA URNS QL MICRO: NEGATIVE /HPF
BASOPHILS # BLD: 0.1 K/UL (ref 0–0.2)
BASOPHILS NFR BLD: 1 % (ref 0–2)
BILIRUB UR QL: NEGATIVE
CASTS URNS QL MICRO: ABNORMAL /LPF
COLOR UR: ABNORMAL
DIFFERENTIAL METHOD BLD: ABNORMAL
EOSINOPHIL # BLD: 0.2 K/UL (ref 0–0.8)
EOSINOPHIL NFR BLD: 3 % (ref 0.5–7.8)
EPI CELLS #/AREA URNS HPF: ABNORMAL /HPF
ERYTHROCYTE [DISTWIDTH] IN BLOOD BY AUTOMATED COUNT: 12.9 % (ref 11.9–14.6)
GLUCOSE UR STRIP.AUTO-MCNC: NEGATIVE MG/DL
HCT VFR BLD AUTO: 41.3 % (ref 41.1–50.3)
HGB BLD-MCNC: 14 G/DL (ref 13.6–17.2)
HGB UR QL STRIP: NEGATIVE
IMM GRANULOCYTES # BLD AUTO: 0 K/UL (ref 0–0.5)
IMM GRANULOCYTES NFR BLD AUTO: 0 % (ref 0–5)
KETONES UR QL STRIP.AUTO: ABNORMAL MG/DL
LEUKOCYTE ESTERASE UR QL STRIP.AUTO: ABNORMAL
LYMPHOCYTES # BLD: 1.4 K/UL (ref 0.5–4.6)
LYMPHOCYTES NFR BLD: 21 % (ref 13–44)
MCH RBC QN AUTO: 33.3 PG (ref 26.1–32.9)
MCHC RBC AUTO-ENTMCNC: 33.9 G/DL (ref 31.4–35)
MCV RBC AUTO: 98.1 FL (ref 82–102)
MONOCYTES # BLD: 0.7 K/UL (ref 0.1–1.3)
MONOCYTES NFR BLD: 11 % (ref 4–12)
MUCOUS THREADS URNS QL MICRO: 0 /LPF
NEUTS SEG # BLD: 4.2 K/UL (ref 1.7–8.2)
NEUTS SEG NFR BLD: 64 % (ref 43–78)
NITRITE UR QL STRIP.AUTO: NEGATIVE
NRBC # BLD: 0 K/UL (ref 0–0.2)
PH UR STRIP: 6.5 [PH] (ref 5–9)
PLATELET # BLD AUTO: 155 K/UL (ref 150–450)
PMV BLD AUTO: 12.6 FL (ref 9.4–12.3)
PROT UR STRIP-MCNC: ABNORMAL MG/DL
PSA SERPL-MCNC: 4.9 NG/ML
RBC # BLD AUTO: 4.21 M/UL (ref 4.23–5.6)
RBC #/AREA URNS HPF: ABNORMAL /HPF
SP GR UR REFRACTOMETRY: 1.02 (ref 1–1.02)
URINE CULTURE IF INDICATED: ABNORMAL
UROBILINOGEN UR QL STRIP.AUTO: 1 EU/DL (ref 0.2–1)
WBC # BLD AUTO: 6.6 K/UL (ref 4.3–11.1)
WBC URNS QL MICRO: ABNORMAL /HPF

## 2022-11-03 PROCEDURE — 1123F ACP DISCUSS/DSCN MKR DOCD: CPT | Performed by: FAMILY MEDICINE

## 2022-11-03 PROCEDURE — G0439 PPPS, SUBSEQ VISIT: HCPCS | Performed by: FAMILY MEDICINE

## 2022-11-03 ASSESSMENT — PATIENT HEALTH QUESTIONNAIRE - PHQ9
SUM OF ALL RESPONSES TO PHQ QUESTIONS 1-9: 0
2. FEELING DOWN, DEPRESSED OR HOPELESS: 0
1. LITTLE INTEREST OR PLEASURE IN DOING THINGS: 0
SUM OF ALL RESPONSES TO PHQ QUESTIONS 1-9: 0
SUM OF ALL RESPONSES TO PHQ QUESTIONS 1-9: 0
SUM OF ALL RESPONSES TO PHQ9 QUESTIONS 1 & 2: 0
SUM OF ALL RESPONSES TO PHQ QUESTIONS 1-9: 0

## 2022-11-03 ASSESSMENT — LIFESTYLE VARIABLES
HOW OFTEN DO YOU HAVE A DRINK CONTAINING ALCOHOL: MONTHLY OR LESS
HOW MANY STANDARD DRINKS CONTAINING ALCOHOL DO YOU HAVE ON A TYPICAL DAY: PATIENT DOES NOT DRINK

## 2022-11-04 LAB
ALBUMIN SERPL-MCNC: 4.1 G/DL (ref 3.2–4.6)
ALBUMIN/GLOB SERPL: 1.7 {RATIO} (ref 0.4–1.6)
ALP SERPL-CCNC: 60 U/L (ref 50–136)
ALT SERPL-CCNC: 30 U/L (ref 12–65)
ANION GAP SERPL CALC-SCNC: 8 MMOL/L (ref 2–11)
AST SERPL-CCNC: 12 U/L (ref 15–37)
BILIRUB SERPL-MCNC: 0.7 MG/DL (ref 0.2–1.1)
BUN SERPL-MCNC: 14 MG/DL (ref 8–23)
CALCIUM SERPL-MCNC: 9.3 MG/DL (ref 8.3–10.4)
CHLORIDE SERPL-SCNC: 102 MMOL/L (ref 101–110)
CHOLEST SERPL-MCNC: 189 MG/DL
CO2 SERPL-SCNC: 27 MMOL/L (ref 21–32)
CREAT SERPL-MCNC: 1.2 MG/DL (ref 0.8–1.5)
GLOBULIN SER CALC-MCNC: 2.4 G/DL (ref 2.8–4.5)
GLUCOSE SERPL-MCNC: 80 MG/DL (ref 65–100)
HDLC SERPL-MCNC: 50 MG/DL (ref 40–60)
HDLC SERPL: 3.8 {RATIO}
LDLC SERPL CALC-MCNC: 125.8 MG/DL
POTASSIUM SERPL-SCNC: 3.8 MMOL/L (ref 3.5–5.1)
PROT SERPL-MCNC: 6.5 G/DL (ref 6.3–8.2)
SODIUM SERPL-SCNC: 137 MMOL/L (ref 133–143)
TRIGL SERPL-MCNC: 66 MG/DL (ref 35–150)
TSH, 3RD GENERATION: 3.14 UIU/ML (ref 0.36–3.74)
VLDLC SERPL CALC-MCNC: 13.2 MG/DL (ref 6–23)

## 2022-11-20 NOTE — PROGRESS NOTES
Medicare Annual Wellness Visit    Alanna Demarco is here for Anupam PETIT    Assessment & Plan   Medicare annual wellness visit, subsequent  -     CBC with Auto Differential; Future  -     Comprehensive Metabolic Panel; Future  -     Lipid Panel; Future  -     Urinalysis with Reflex to Culture; Future  -     TSH; Future  Elevated prostate specific antigen (PSA)  Pure hypercholesterolemia, unspecified  -     CBC with Auto Differential; Future  -     Comprehensive Metabolic Panel; Future  -     Lipid Panel; Future  -     Urinalysis with Reflex to Culture; Future  -     TSH; Future  Screening for malignant neoplasm of prostate    Recommendations for Preventive Services Due: see orders and patient instructions/AVS.  Recommended screening schedule for the next 5-10 years is provided to the patient in written form: see Patient Instructions/AVS.     No follow-ups on file. Subjective   The following acute and/or chronic problems were also addressed today:    Patient's complete Health Risk Assessment and screening values have been reviewed and are found in Flowsheets. The following problems were reviewed today and where indicated follow up appointments were made and/or referrals ordered.     Positive Risk Factor Screenings with Interventions:             General Health and ACP:  General  In general, how would you say your health is?: Excellent  In the past 7 days, have you experienced any of the following: New or Increased Pain, New or Increased Fatigue, Loneliness, Social Isolation, Stress or Anger?: No  Do you get the social and emotional support that you need?: Yes  Do you have a Living Will?: Yes    Advance Directives       Power of  Living Will ACP-Advance Directive ACP-Power of     Not on File Not on File Not on File Not on File        General Health Risk Interventions:                Objective   Vitals:    11/03/22 1443   BP: (!) 162/82   Pulse: 73   Temp: 97.4 °F (36.3 °C)   TempSrc: Temporal   SpO2: 97%   Weight: 176 lb (79.8 kg)   Height: 5' 11\" (1.803 m)      Body mass index is 24.55 kg/m². General Appearance: alert and oriented to person, place and time, well developed and well- nourished, in no acute distress  Skin: warm and dry, no rash or erythema  Head: normocephalic and atraumatic  Eyes: pupils equal, round, and reactive to light, extraocular eye movements intact, conjunctivae normal  ENT: tympanic membrane, external ear and ear canal normal bilaterally, nose without deformity, nasal mucosa and turbinates normal without polyps  Neck: supple and non-tender without mass, no thyromegaly or thyroid nodules, no cervical lymphadenopathy  Pulmonary/Chest: clear to auscultation bilaterally- no wheezes, rales or rhonchi, normal air movement, no respiratory distress  Cardiovascular: normal rate, regular rhythm, normal S1 and S2, no murmurs, rubs, clicks, or gallops, distal pulses intact, no carotid bruits  Abdomen: soft, non-tender, non-distended, normal bowel sounds, no masses or organomegaly  Genitourinary/Rectal: genitals normal without hernia or inguinal adenopathy, rectal normal without masses, prostate normal in size without masses or nodules  Extremities: no cyanosis, clubbing or edema  Musculoskeletal: normal range of motion, no joint swelling, deformity or tenderness  Neurologic: reflexes normal and symmetric, no cranial nerve deficit, gait, coordination and speech normal       No Known Allergies  Prior to Visit Medications    Medication Sig Taking?  Authorizing Provider   sildenafil (REVATIO) 20 MG tablet Take 20 mg by mouth 3 times daily  Ar Automatic Reconciliation       CareTeam (Including outside providers/suppliers regularly involved in providing care):   Patient Care Team:  Susana Guan MD as PCP - Jitendra Og MD as PCP - [facility]     Reviewed and updated this visit:  Allergies  Meds  Med Hx  Surg Hx  Soc Hx  Fam Hx

## 2022-11-20 NOTE — PATIENT INSTRUCTIONS
Personalized Preventive Plan for Josseline Lantigua - 11/3/2022  Medicare offers a range of preventive health benefits. Some of the tests and screenings are paid in full while other may be subject to a deductible, co-insurance, and/or copay. Some of these benefits include a comprehensive review of your medical history including lifestyle, illnesses that may run in your family, and various assessments and screenings as appropriate. After reviewing your medical record and screening and assessments performed today your provider may have ordered immunizations, labs, imaging, and/or referrals for you. A list of these orders (if applicable) as well as your Preventive Care list are included within your After Visit Summary for your review. Other Preventive Recommendations:    A preventive eye exam performed by an eye specialist is recommended every 1-2 years to screen for glaucoma; cataracts, macular degeneration, and other eye disorders. A preventive dental visit is recommended every 6 months. Try to get at least 150 minutes of exercise per week or 10,000 steps per day on a pedometer . Order or download the FREE \"Exercise & Physical Activity: Your Everyday Guide\" from [employer]. Call 4-372.265.4843 or search [employer] online. You need 3106-3700 mg of calcium and 2446-5332 IU of vitamin D per day. It is possible to meet your calcium requirement with diet alone, but a vitamin D supplement is usually necessary to meet this goal.  When exposed to the sun, use a sunscreen that protects against both UVA and UVB radiation with an SPF of 30 or greater. Reapply every 2 to 3 hours or after sweating, drying off with a towel, or swimming. Always wear a seat belt when traveling in a car. Always wear a helmet when riding a bicycle or motorcycle.

## 2022-12-03 PROBLEM — Z12.5 SCREENING FOR MALIGNANT NEOPLASM OF PROSTATE: Status: RESOLVED | Noted: 2022-11-03 | Resolved: 2022-12-03

## 2023-02-14 SDOH — ECONOMIC STABILITY: INCOME INSECURITY: HOW HARD IS IT FOR YOU TO PAY FOR THE VERY BASICS LIKE FOOD, HOUSING, MEDICAL CARE, AND HEATING?: NOT HARD AT ALL

## 2023-02-14 SDOH — ECONOMIC STABILITY: FOOD INSECURITY: WITHIN THE PAST 12 MONTHS, YOU WORRIED THAT YOUR FOOD WOULD RUN OUT BEFORE YOU GOT MONEY TO BUY MORE.: NEVER TRUE

## 2023-02-14 SDOH — ECONOMIC STABILITY: HOUSING INSECURITY
IN THE LAST 12 MONTHS, WAS THERE A TIME WHEN YOU DID NOT HAVE A STEADY PLACE TO SLEEP OR SLEPT IN A SHELTER (INCLUDING NOW)?: NO

## 2023-02-14 SDOH — ECONOMIC STABILITY: FOOD INSECURITY: WITHIN THE PAST 12 MONTHS, THE FOOD YOU BOUGHT JUST DIDN'T LAST AND YOU DIDN'T HAVE MONEY TO GET MORE.: NEVER TRUE

## 2023-02-14 SDOH — ECONOMIC STABILITY: TRANSPORTATION INSECURITY
IN THE PAST 12 MONTHS, HAS LACK OF TRANSPORTATION KEPT YOU FROM MEETINGS, WORK, OR FROM GETTING THINGS NEEDED FOR DAILY LIVING?: NO

## 2023-02-17 ENCOUNTER — OFFICE VISIT (OUTPATIENT)
Dept: FAMILY MEDICINE CLINIC | Facility: CLINIC | Age: 78
End: 2023-02-17

## 2023-02-17 VITALS
WEIGHT: 179 LBS | DIASTOLIC BLOOD PRESSURE: 78 MMHG | HEIGHT: 71 IN | BODY MASS INDEX: 25.06 KG/M2 | HEART RATE: 74 BPM | TEMPERATURE: 97.2 F | SYSTOLIC BLOOD PRESSURE: 140 MMHG | OXYGEN SATURATION: 98 %

## 2023-02-17 DIAGNOSIS — L91.8 SKIN TAG: Primary | ICD-10-CM

## 2023-02-17 RX ORDER — CLOTRIMAZOLE AND BETAMETHASONE DIPROPIONATE 10; .64 MG/G; MG/G
CREAM TOPICAL
Qty: 15 G | Refills: 1 | Status: SHIPPED | OUTPATIENT
Start: 2023-02-17

## 2023-02-17 RX ORDER — SILDENAFIL CITRATE 20 MG/1
20 TABLET ORAL DAILY
Qty: 90 TABLET | Refills: 3 | Status: SHIPPED | OUTPATIENT
Start: 2023-02-17

## 2023-02-17 ASSESSMENT — PATIENT HEALTH QUESTIONNAIRE - PHQ9
SUM OF ALL RESPONSES TO PHQ QUESTIONS 1-9: 0
SUM OF ALL RESPONSES TO PHQ9 QUESTIONS 1 & 2: 0
SUM OF ALL RESPONSES TO PHQ QUESTIONS 1-9: 0
SUM OF ALL RESPONSES TO PHQ QUESTIONS 1-9: 0
1. LITTLE INTEREST OR PLEASURE IN DOING THINGS: 0
2. FEELING DOWN, DEPRESSED OR HOPELESS: 0
SUM OF ALL RESPONSES TO PHQ QUESTIONS 1-9: 0

## 2023-02-26 ASSESSMENT — ENCOUNTER SYMPTOMS
RESPIRATORY NEGATIVE: 1
EYES NEGATIVE: 1
GASTROINTESTINAL NEGATIVE: 1

## 2023-02-27 NOTE — PROGRESS NOTES
HISTORY OF PRESENT ILLNESS  Leonila Bell is a 69 y.o. y.o. male    Skin Problem  This is a new (Changing skin tag right superior anterior chest) problem. The current episode started more than 1 month ago. The problem has been gradually worsening since onset. No Known Allergies     Current Outpatient Medications   Medication Sig    sildenafil (REVATIO) 20 MG tablet Take 1 tablet by mouth daily    clotrimazole-betamethasone (LOTRISONE) 1-0.05 % cream Apply topically 2 times daily. No current facility-administered medications for this visit. Past Medical History:   Diagnosis Date    Arthritis     Elevated PSA     PUD (peptic ulcer disease)     50 yrs ago        Past Surgical History:   Procedure Laterality Date    COLONOSCOPY N/A 6/15/2020    COLONOSCOPY/ BMI 27 performed by Kar Dhaliwal MD at [facility]  2010    HEENT Bilateral 2009    blepharaplasty    HEENT Bilateral 2013    blepharaplast    TONSILLECTOMY          Social History     Socioeconomic History    Marital status:      Spouse name: Not on file    Number of children: Not on file    Years of education: Not on file    Highest education level: Not on file   Occupational History    Not on file   Tobacco Use    Smoking status: Never    Smokeless tobacco: Never   Substance and Sexual Activity    Alcohol use: Yes     Alcohol/week: 0.8 - 1.7 standard drinks    Drug use: No    Sexual activity: Not on file   Other Topics Concern    Not on file   Social History Narrative    Not on file     Social Determinants of Health     Financial Resource Strain: Low Risk     Difficulty of Paying Living Expenses: Not hard at all   Food Insecurity: No Food Insecurity    Worried About [address] in the Last Year: Never true    [facility] in the Last Year: Never true   Transportation Needs: Unknown    Lack of Transportation (Medical): Not on file    Lack of Transportation (Non-Medical):  No   Physical Activity: Sufficiently Active    Days of Exercise per Week: 4 days    Minutes of Exercise per Session: 70 min   Stress: Not on file   Social Connections: Not on file   Intimate Partner Violence: Not on file   Housing Stability: Unknown    Unable to Pay for Housing in the Last Year: Not on file    Number of Places Lived in the Last Year: Not on file    Unstable Housing in the Last Year: No        Review of Systems   Constitutional: Negative. HENT: Negative. Eyes: Negative. Respiratory: Negative. Cardiovascular: Negative. Gastrointestinal: Negative. Endocrine: Negative. Genitourinary: Negative. Skin: Negative. Neurological: Negative. Psychiatric/Behavioral: Negative. BP (!) 140/78   Pulse 74   Temp 97.2 °F (36.2 °C) (Temporal)   Ht 5' 11\" (1.803 m)   Wt 179 lb (81.2 kg)   SpO2 98%   BMI 24.97 kg/m²      Physical Exam  Constitutional:       General: He is not in acute distress. Appearance: Normal appearance. HENT:      Head: Normocephalic. Nose: Nose normal.   Eyes:      Conjunctiva/sclera: Conjunctivae normal.   Cardiovascular:      Rate and Rhythm: Normal rate. Pulmonary:      Effort: Pulmonary effort is normal.      Breath sounds: Normal breath sounds. Chest:          Comments: PROCEDURE ; Removal of a rough skin tag , via shave Bx , under sterile tech , with 1 % xylocaine in the sub tissue aw/O complication , specimen sent for path . Wound care instructions given . Abdominal:      General: Abdomen is flat. Bowel sounds are normal.      Palpations: Abdomen is soft. Musculoskeletal:         General: Normal range of motion. Cervical back: Normal range of motion. Skin:     General: Skin is warm and dry. Neurological:      General: No focal deficit present. Mental Status: He is alert.    Psychiatric:         Mood and Affect: Mood normal.       Office Visit on 11/03/2022   Component Date Value Ref Range Status    TSH, 3RD GENERATION 11/03/2022 3.140  0.358 - 3.740 uIU/mL Final    Color, UA 11/03/2022 YELLOW/STRAW    Final    Color Reference Range: Straw, Yellow or Dark Yellow    Appearance 11/03/2022 CLEAR    Final    Specific Gravity, UA 11/03/2022 1.020  1.001 - 1.023   Final    pH, Urine 11/03/2022 6.5  5.0 - 9.0   Final    Protein, UA 11/03/2022 TRACE (A)  Negative mg/dL Final    Glucose, UA 11/03/2022 Negative  mg/dL Final    Ketones, Urine 11/03/2022 TRACE (A)  Negative mg/dL Final    Bilirubin Urine 11/03/2022 Negative  Negative   Final    Blood, Urine 11/03/2022 Negative  Negative   Final    Urobilinogen, Urine 11/03/2022 1.0  0.2 - 1.0 EU/dL Final    Nitrite, Urine 11/03/2022 Negative  Negative   Final    Leukocyte Esterase, Urine 11/03/2022 TRACE (A)  Negative   Final    Urine Culture if Indicated 11/03/2022 CULTURE NOT INDICATED BY UA RESULT    Final    WBC, UA 11/03/2022 5-10 (A)  NORMAL /hpf Final    RBC, UA 11/03/2022 0-5 (A)  NORMAL /hpf Final    BACTERIA, URINE 11/03/2022 Negative (A)  NORMAL /hpf Final    Epithelial Cells UA 11/03/2022 0-5 (A)  NORMAL /hpf Final    Casts 11/03/2022 0-2 (A)  NORMAL /lpf Final    Mucus, UA 11/03/2022 0  0 /lpf Final    Cholesterol, Total 11/03/2022 189  <200 MG/DL Final    Comment: Borderline High: 200-239 mg/dL  High: Greater than or equal to 240 mg/dL      Triglycerides 11/03/2022 66  35 - 150 MG/DL Final    Comment: Borderline High: 150-199 mg/dL, High: 200-499 mg/dL  Very High: Greater than or equal to 500 mg/dL      HDL 11/03/2022 50  40 - 60 MG/DL Final    LDL Calculated 11/03/2022 125.8 (A)  <100 MG/DL Final    Comment: Near Optimal: 100-129 mg/dL  Borderline High: 130-159, High: 160-189 mg/dL  Very High: Greater than or equal to 190 mg/dL      VLDL Cholesterol Calculated 11/03/2022 13.2  6.0 - 23.0 MG/DL Final    Chol/HDL Ratio 11/03/2022 3.8    Final    Sodium 11/03/2022 137  133 - 143 mmol/L Final    Potassium 11/03/2022 3.8  3.5 - 5.1 mmol/L Final    Chloride 11/03/2022 102  101 - 110 mmol/L Final    CO2 11/03/2022 27  21 - 32 mmol/L Final    Anion Gap 11/03/2022 8  2 - 11 mmol/L Final    Glucose 11/03/2022 80  65 - 100 mg/dL Final    BUN 11/03/2022 14  8 - 23 MG/DL Final    Creatinine 11/03/2022 1.20  0.8 - 1.5 MG/DL Final    Est, Glom Filt Rate 11/03/2022 >60  >60 ml/min/1.73m2 Final    Comment:   Pediatric calculator link: Sel[employer]. org/professionals/kdoqi/gfr_calculatorped    Effective Oct 3, 2022    These results are not intended for use in patients <25years of age. eGFR results are calculated without a race factor using  the 2021 CKD-EPI equation. Careful clinical correlation is recommended, particularly when comparing to results calculated using previous equations. The CKD-EPI equation is less accurate in patients with extremes of muscle mass, extra-renal metabolism of creatinine, excessive creatine ingestion, or following therapy that affects renal tubular secretion.       Calcium 11/03/2022 9.3  8.3 - 10.4 MG/DL Final    Total Bilirubin 11/03/2022 0.7  0.2 - 1.1 MG/DL Final    ALT 11/03/2022 30  12 - 65 U/L Final    AST 11/03/2022 12 (A)  15 - 37 U/L Final    Alk Phosphatase 11/03/2022 60  50 - 136 U/L Final    Total Protein 11/03/2022 6.5  6.3 - 8.2 g/dL Final    Albumin 11/03/2022 4.1  3.2 - 4.6 g/dL Final    Globulin 11/03/2022 2.4 (A)  2.8 - 4.5 g/dL Final    Albumin/Globulin Ratio 11/03/2022 1.7 (A)  0.4 - 1.6   Final    WBC 11/03/2022 6.6  4.3 - 11.1 K/uL Final    RBC 11/03/2022 4.21 (A)  4.23 - 5.6 M/uL Final    Hemoglobin 11/03/2022 14.0  13.6 - 17.2 g/dL Final    Hematocrit 11/03/2022 41.3  41.1 - 50.3 % Final    MCV 11/03/2022 98.1  82 - 102 FL Final    MCH 11/03/2022 33.3 (A)  26.1 - 32.9 PG Final    MCHC 11/03/2022 33.9  31.4 - 35.0 g/dL Final    RDW 11/03/2022 12.9  11.9 - 14.6 % Final    Platelets 78/77/4220 155  150 - 450 K/uL Final    MPV 11/03/2022 12.6 (A)  9.4 - 12.3 FL Final    nRBC 11/03/2022 0.00  0.0 - 0.2 K/uL Final    **Note: Absolute NRBC parameter is now reported with Hemogram**    Differential Type 11/03/2022 AUTOMATED    Final    Seg Neutrophils 11/03/2022 64  43 - 78 % Final    Lymphocytes 11/03/2022 21  13 - 44 % Final    Monocytes 11/03/2022 11  4.0 - 12.0 % Final    Eosinophils % 11/03/2022 3  0.5 - 7.8 % Final    Basophils 11/03/2022 1  0.0 - 2.0 % Final    Immature Granulocytes 11/03/2022 0  0.0 - 5.0 % Final    Segs Absolute 11/03/2022 4.2  1.7 - 8.2 K/UL Final    Absolute Lymph # 11/03/2022 1.4  0.5 - 4.6 K/UL Final    Absolute Mono # 11/03/2022 0.7  0.1 - 1.3 K/UL Final    Absolute Eos # 11/03/2022 0.2  0.0 - 0.8 K/UL Final    Basophils Absolute 11/03/2022 0.1  0.0 - 0.2 K/UL Final    Absolute Immature Granulocyte 11/03/2022 0.0  0.0 - 0.5 K/UL Final    PSA 11/03/2022 4.9 (A)  <4.0 ng/mL Final    Comment: [employer]. New method in use, please reestablish patient baseline         ASSESSMENT and PLAN    Skin tag  -     [facility] Surgical Pathology; Future  -     SHAV SKIN LES <5MM TRUNK,ARM,LEG      Current treatment plan is effective. no changes in therapy    No follow-ups on file.      Marielle Potter MD

## (undated) DEVICE — SYR 5ML 1/5 GRAD LL NSAF LF --

## (undated) DEVICE — FCPS BX HOT RJ4 2.2MMX240CM -- RADIAL JAW 4 BX/40

## (undated) DEVICE — KENDALL RADIOLUCENT FOAM MONITORING ELECTRODE RECTANGULAR SHAPE: Brand: KENDALL

## (undated) DEVICE — CONNECTOR TBNG OD5-7MM O2 END DISP

## (undated) DEVICE — CANNULA NSL ORAL AD FOR CAPNOFLEX CO2 O2 AIRLFE

## (undated) DEVICE — REM POLYHESIVE ADULT PATIENT RETURN ELECTRODE: Brand: VALLEYLAB

## (undated) DEVICE — SYR 3ML LL TIP 1/10ML GRAD --

## (undated) DEVICE — NDL PRT INJ NSAF BLNT 18GX1.5 --

## (undated) DEVICE — CONTAINER PREFIL FRMLN 40ML --